# Patient Record
Sex: FEMALE
[De-identification: names, ages, dates, MRNs, and addresses within clinical notes are randomized per-mention and may not be internally consistent; named-entity substitution may affect disease eponyms.]

---

## 2018-03-06 ENCOUNTER — CHARTING TRANS (OUTPATIENT)
Dept: OTHER | Age: 33
End: 2018-03-06

## 2021-08-18 ENCOUNTER — OFFICE VISIT (OUTPATIENT)
Dept: FAMILY MEDICINE CLINIC | Facility: CLINIC | Age: 36
End: 2021-08-18
Payer: COMMERCIAL

## 2021-08-18 VITALS
RESPIRATION RATE: 16 BRPM | HEIGHT: 62 IN | OXYGEN SATURATION: 100 % | WEIGHT: 134 LBS | BODY MASS INDEX: 24.66 KG/M2 | SYSTOLIC BLOOD PRESSURE: 94 MMHG | DIASTOLIC BLOOD PRESSURE: 60 MMHG | HEART RATE: 79 BPM

## 2021-08-18 DIAGNOSIS — Z00.00 PHYSICAL EXAM, ANNUAL: Primary | ICD-10-CM

## 2021-08-18 DIAGNOSIS — R09.81 NASAL CONGESTION: ICD-10-CM

## 2021-08-18 DIAGNOSIS — Z00.00 LABORATORY EXAMINATION ORDERED AS PART OF A ROUTINE GENERAL MEDICAL EXAMINATION: ICD-10-CM

## 2021-08-18 DIAGNOSIS — Z13.89 SCREENING FOR GENITOURINARY CONDITION: ICD-10-CM

## 2021-08-18 DIAGNOSIS — R10.13 EPIGASTRIC ABDOMINAL PAIN: ICD-10-CM

## 2021-08-18 PROCEDURE — 3074F SYST BP LT 130 MM HG: CPT | Performed by: FAMILY MEDICINE

## 2021-08-18 PROCEDURE — 3078F DIAST BP <80 MM HG: CPT | Performed by: FAMILY MEDICINE

## 2021-08-18 PROCEDURE — 3008F BODY MASS INDEX DOCD: CPT | Performed by: FAMILY MEDICINE

## 2021-08-18 PROCEDURE — 99385 PREV VISIT NEW AGE 18-39: CPT | Performed by: FAMILY MEDICINE

## 2021-08-18 NOTE — PROGRESS NOTES
HPI:   Gerard Soria is a 39year old female who presents for a complete physical exam. Symptoms: denies discharge, itching, burning or dysuria. Patient complains of having some pain in epigastric area.   Patient started to drink some teaand got better with Use: Never used    Alcohol use: No    Drug use: No    Occ: . : Yes.  Children: 2       REVIEW OF SYSTEMS:   GENERAL: feels well otherwise  SKIN: denies any unusual skin lesions  EYES:denies blurred vision or double vision  HEENT: denies n general medical examination  Screening for genitourinary condition  Nasal congestion  Epigastric abdominal pain    Orders Placed This Encounter      CBC With Differential With Platelet      Comp Metabolic Panel (14)      Lipid Panel      TSH W Reflex To Fr

## 2021-08-18 NOTE — PATIENT INSTRUCTIONS
Healthy diet. Stay active. Return for fasting blood work. You can walk-in or call [phone] to schedule the test.  See your GYN for your Pap smear and pelvic examination. Consider seeing allergist depending on your symptoms.   Set up an appointment aft

## 2021-08-23 ENCOUNTER — LAB ENCOUNTER (OUTPATIENT)
Dept: LAB | Age: 36
End: 2021-08-23
Attending: FAMILY MEDICINE
Payer: COMMERCIAL

## 2021-08-23 DIAGNOSIS — Z00.00 LABORATORY EXAMINATION ORDERED AS PART OF A ROUTINE GENERAL MEDICAL EXAMINATION: ICD-10-CM

## 2021-08-23 LAB
ALBUMIN SERPL-MCNC: 4 G/DL (ref 3.4–5)
ALBUMIN/GLOB SERPL: 1.2 {RATIO} (ref 1–2)
ALP LIVER SERPL-CCNC: 48 U/L
ALT SERPL-CCNC: 20 U/L
ANION GAP SERPL CALC-SCNC: 1 MMOL/L (ref 0–18)
AST SERPL-CCNC: 15 U/L (ref 15–37)
BASOPHILS # BLD AUTO: 0.03 X10(3) UL (ref 0–0.2)
BASOPHILS NFR BLD AUTO: 0.8 %
BILIRUB SERPL-MCNC: 0.4 MG/DL (ref 0.1–2)
BUN BLD-MCNC: 15 MG/DL (ref 7–18)
CALCIUM BLD-MCNC: 8.8 MG/DL (ref 8.5–10.1)
CHLORIDE SERPL-SCNC: 109 MMOL/L (ref 98–112)
CHOLEST SMN-MCNC: 195 MG/DL (ref ?–200)
CO2 SERPL-SCNC: 29 MMOL/L (ref 21–32)
CREAT BLD-MCNC: 0.68 MG/DL
EOSINOPHIL # BLD AUTO: 0.15 X10(3) UL (ref 0–0.7)
EOSINOPHIL NFR BLD AUTO: 3.9 %
ERYTHROCYTE [DISTWIDTH] IN BLOOD BY AUTOMATED COUNT: 11.7 %
GLOBULIN PLAS-MCNC: 3.3 G/DL (ref 2.8–4.4)
GLUCOSE BLD-MCNC: 84 MG/DL (ref 70–99)
HCT VFR BLD AUTO: 39.6 %
HDLC SERPL-MCNC: 75 MG/DL (ref 40–59)
HGB BLD-MCNC: 12.5 G/DL
IMM GRANULOCYTES # BLD AUTO: 0 X10(3) UL (ref 0–1)
IMM GRANULOCYTES NFR BLD: 0 %
LDLC SERPL CALC-MCNC: 111 MG/DL (ref ?–100)
LIPASE SERPL-CCNC: 113 U/L (ref 73–393)
LYMPHOCYTES # BLD AUTO: 1.79 X10(3) UL (ref 1–4)
LYMPHOCYTES NFR BLD AUTO: 46 %
M PROTEIN MFR SERPL ELPH: 7.3 G/DL (ref 6.4–8.2)
MCH RBC QN AUTO: 28.9 PG (ref 26–34)
MCHC RBC AUTO-ENTMCNC: 31.6 G/DL (ref 31–37)
MCV RBC AUTO: 91.5 FL
MONOCYTES # BLD AUTO: 0.3 X10(3) UL (ref 0.1–1)
MONOCYTES NFR BLD AUTO: 7.7 %
NEUTROPHILS # BLD AUTO: 1.62 X10 (3) UL (ref 1.5–7.7)
NEUTROPHILS # BLD AUTO: 1.62 X10(3) UL (ref 1.5–7.7)
NEUTROPHILS NFR BLD AUTO: 41.6 %
NONHDLC SERPL-MCNC: 120 MG/DL (ref ?–130)
OSMOLALITY SERPL CALC.SUM OF ELEC: 288 MOSM/KG (ref 275–295)
PATIENT FASTING Y/N/NP: YES
PATIENT FASTING Y/N/NP: YES
PLATELET # BLD AUTO: 161 10(3)UL (ref 150–450)
POTASSIUM SERPL-SCNC: 3.9 MMOL/L (ref 3.5–5.1)
RBC # BLD AUTO: 4.33 X10(6)UL
SODIUM SERPL-SCNC: 139 MMOL/L (ref 136–145)
TRIGL SERPL-MCNC: 48 MG/DL (ref 30–149)
TSI SER-ACNC: 1.74 MIU/ML (ref 0.36–3.74)
VLDLC SERPL CALC-MCNC: 8 MG/DL (ref 0–30)
WBC # BLD AUTO: 3.9 X10(3) UL (ref 4–11)

## 2021-08-23 PROCEDURE — 80053 COMPREHEN METABOLIC PANEL: CPT

## 2021-08-23 PROCEDURE — 36415 COLL VENOUS BLD VENIPUNCTURE: CPT

## 2021-08-23 PROCEDURE — 85025 COMPLETE CBC W/AUTO DIFF WBC: CPT

## 2021-08-23 PROCEDURE — 80061 LIPID PANEL: CPT

## 2021-08-23 PROCEDURE — 84443 ASSAY THYROID STIM HORMONE: CPT

## 2022-05-27 ENCOUNTER — TELEPHONE (OUTPATIENT)
Dept: FAMILY MEDICINE CLINIC | Facility: CLINIC | Age: 37
End: 2022-05-27

## 2022-05-27 NOTE — TELEPHONE ENCOUNTER
Pt requesting appointment today for evaluation of bumps on back of neck. Pt states noticed several small bumps on her neck on 5/25, pt unsure if these had been there prior or not. Pt did not indicate any pain. Pt hoping for evaluation of bumps before holiday weekend. Please advise?

## 2022-09-13 ENCOUNTER — TELEPHONE (OUTPATIENT)
Dept: FAMILY MEDICINE CLINIC | Facility: CLINIC | Age: 37
End: 2022-09-13

## 2022-09-13 NOTE — TELEPHONE ENCOUNTER
Pt requesting appointment for examination of possible eczema/ allergic reaction on back of neck? Pt states has had this irritation on back of neck ongoing 1 month, worsening and spreading. Pt states the area is very itchy. Notes she has used many different eczema lotions and has offered no relief. Pt hoping to be seen in office ASAP if possible. Routing to triage to determine if additional information is needed. Routing to Dr S to determine if / when apt can be scheduled.     Please advise

## 2022-09-16 ENCOUNTER — OFFICE VISIT (OUTPATIENT)
Dept: FAMILY MEDICINE CLINIC | Facility: CLINIC | Age: 37
End: 2022-09-16
Payer: COMMERCIAL

## 2022-09-16 VITALS
BODY MASS INDEX: 25.21 KG/M2 | DIASTOLIC BLOOD PRESSURE: 62 MMHG | HEART RATE: 70 BPM | WEIGHT: 137 LBS | RESPIRATION RATE: 16 BRPM | TEMPERATURE: 97 F | SYSTOLIC BLOOD PRESSURE: 94 MMHG | HEIGHT: 62 IN

## 2022-09-16 DIAGNOSIS — R53.83 FATIGUE, UNSPECIFIED TYPE: ICD-10-CM

## 2022-09-16 DIAGNOSIS — L30.9 ECZEMA, UNSPECIFIED TYPE: Primary | ICD-10-CM

## 2022-09-16 DIAGNOSIS — Z00.00 LABORATORY EXAMINATION ORDERED AS PART OF A ROUTINE GENERAL MEDICAL EXAMINATION: ICD-10-CM

## 2022-09-16 DIAGNOSIS — D72.819 LEUKOPENIA, UNSPECIFIED TYPE: ICD-10-CM

## 2022-09-16 PROCEDURE — 99213 OFFICE O/P EST LOW 20 MIN: CPT | Performed by: FAMILY MEDICINE

## 2022-09-16 PROCEDURE — 3074F SYST BP LT 130 MM HG: CPT | Performed by: FAMILY MEDICINE

## 2022-09-16 PROCEDURE — 3008F BODY MASS INDEX DOCD: CPT | Performed by: FAMILY MEDICINE

## 2022-09-16 PROCEDURE — 3078F DIAST BP <80 MM HG: CPT | Performed by: FAMILY MEDICINE

## 2022-09-16 RX ORDER — MOMETASONE FUROATE 1 MG/G
1 CREAM TOPICAL 2 TIMES DAILY PRN
Qty: 30 G | Refills: 0 | Status: SHIPPED | OUTPATIENT
Start: 2022-09-16

## 2022-09-16 RX ORDER — ERGOCALCIFEROL (VITAMIN D2) 1250 MCG
CAPSULE ORAL
COMMUNITY

## 2022-09-16 NOTE — PATIENT INSTRUCTIONS
Try mometasone cream twice a day for 2 weeks to the rash on the posterior neck then if needed use hydrocortisone 1% over-the-counter. Use moisturizers. Do not scratch your skin. Monitor symptoms.   Do blood work before your physical.

## 2022-09-26 ENCOUNTER — LAB ENCOUNTER (OUTPATIENT)
Dept: LAB | Age: 37
End: 2022-09-26
Attending: FAMILY MEDICINE

## 2022-09-26 DIAGNOSIS — Z00.00 LABORATORY EXAMINATION ORDERED AS PART OF A ROUTINE GENERAL MEDICAL EXAMINATION: ICD-10-CM

## 2022-09-26 DIAGNOSIS — R53.83 FATIGUE, UNSPECIFIED TYPE: ICD-10-CM

## 2022-09-26 DIAGNOSIS — D72.819 LEUKOPENIA, UNSPECIFIED TYPE: ICD-10-CM

## 2022-09-26 LAB
ALBUMIN SERPL-MCNC: 4.4 G/DL (ref 3.4–5)
ALBUMIN/GLOB SERPL: 1.5 {RATIO} (ref 1–2)
ALP LIVER SERPL-CCNC: 46 U/L
ALT SERPL-CCNC: 16 U/L
ANION GAP SERPL CALC-SCNC: 5 MMOL/L (ref 0–18)
AST SERPL-CCNC: 13 U/L (ref 15–37)
BASOPHILS # BLD AUTO: 0.02 X10(3) UL (ref 0–0.2)
BASOPHILS NFR BLD AUTO: 0.3 %
BILIRUB SERPL-MCNC: 0.4 MG/DL (ref 0.1–2)
BILIRUB UR QL: NEGATIVE
BUN BLD-MCNC: 20 MG/DL (ref 7–18)
BUN/CREAT SERPL: 29.4 (ref 10–20)
CALCIUM BLD-MCNC: 8.7 MG/DL (ref 8.5–10.1)
CHLORIDE SERPL-SCNC: 107 MMOL/L (ref 98–112)
CHOLEST SERPL-MCNC: 193 MG/DL (ref ?–200)
CO2 SERPL-SCNC: 26 MMOL/L (ref 21–32)
COLOR UR: YELLOW
CREAT BLD-MCNC: 0.68 MG/DL
DEPRECATED HBV CORE AB SER IA-ACNC: 62.8 NG/ML
DEPRECATED RDW RBC AUTO: 38 FL (ref 35.1–46.3)
EOSINOPHIL # BLD AUTO: 0.11 X10(3) UL (ref 0–0.7)
EOSINOPHIL NFR BLD AUTO: 1.4 %
ERYTHROCYTE [DISTWIDTH] IN BLOOD BY AUTOMATED COUNT: 11.6 % (ref 11–15)
FASTING PATIENT LIPID ANSWER: YES
FASTING STATUS PATIENT QL REPORTED: YES
GFR SERPLBLD BASED ON 1.73 SQ M-ARVRAT: 115 ML/MIN/1.73M2 (ref 60–?)
GLOBULIN PLAS-MCNC: 2.9 G/DL (ref 2.8–4.4)
GLUCOSE BLD-MCNC: 76 MG/DL (ref 70–99)
GLUCOSE UR-MCNC: NEGATIVE MG/DL
HCT VFR BLD AUTO: 39.6 %
HDLC SERPL-MCNC: 70 MG/DL (ref 40–59)
HGB BLD-MCNC: 13 G/DL
HGB UR QL STRIP.AUTO: NEGATIVE
IMM GRANULOCYTES # BLD AUTO: 0.02 X10(3) UL (ref 0–1)
IMM GRANULOCYTES NFR BLD: 0.3 %
IRON SATN MFR SERPL: 23 %
IRON SERPL-MCNC: 80 UG/DL
LDLC SERPL CALC-MCNC: 115 MG/DL (ref ?–100)
LEUKOCYTE ESTERASE UR QL STRIP.AUTO: NEGATIVE
LYMPHOCYTES # BLD AUTO: 1.61 X10(3) UL (ref 1–4)
LYMPHOCYTES NFR BLD AUTO: 20.7 %
MCH RBC QN AUTO: 29.3 PG (ref 26–34)
MCHC RBC AUTO-ENTMCNC: 32.8 G/DL (ref 31–37)
MCV RBC AUTO: 89.2 FL
MONOCYTES # BLD AUTO: 0.48 X10(3) UL (ref 0.1–1)
MONOCYTES NFR BLD AUTO: 6.2 %
NEUTROPHILS # BLD AUTO: 5.55 X10 (3) UL (ref 1.5–7.7)
NEUTROPHILS # BLD AUTO: 5.55 X10(3) UL (ref 1.5–7.7)
NEUTROPHILS NFR BLD AUTO: 71.1 %
NITRITE UR QL STRIP.AUTO: NEGATIVE
NONHDLC SERPL-MCNC: 123 MG/DL (ref ?–130)
OSMOLALITY SERPL CALC.SUM OF ELEC: 287 MOSM/KG (ref 275–295)
PH UR: 5.5 [PH] (ref 5–8)
PLATELET # BLD AUTO: 159 10(3)UL (ref 150–450)
POTASSIUM SERPL-SCNC: 4 MMOL/L (ref 3.5–5.1)
PROT SERPL-MCNC: 7.3 G/DL (ref 6.4–8.2)
RBC # BLD AUTO: 4.44 X10(6)UL
SODIUM SERPL-SCNC: 138 MMOL/L (ref 136–145)
SP GR UR STRIP: >=1.03 (ref 1–1.03)
TIBC SERPL-MCNC: 344 UG/DL (ref 240–450)
TRANSFERRIN SERPL-MCNC: 231 MG/DL (ref 200–360)
TRIGL SERPL-MCNC: 43 MG/DL (ref 30–149)
TSI SER-ACNC: 1.16 MIU/ML (ref 0.36–3.74)
UROBILINOGEN UR STRIP-ACNC: 0.2
VIT B12 SERPL-MCNC: 606 PG/ML (ref 193–986)
VIT D+METAB SERPL-MCNC: 10.5 NG/ML (ref 30–100)
VLDLC SERPL CALC-MCNC: 7 MG/DL (ref 0–30)
WBC # BLD AUTO: 7.8 X10(3) UL (ref 4–11)

## 2022-09-26 PROCEDURE — 84466 ASSAY OF TRANSFERRIN: CPT | Performed by: FAMILY MEDICINE

## 2022-09-26 PROCEDURE — 80061 LIPID PANEL: CPT | Performed by: FAMILY MEDICINE

## 2022-09-26 PROCEDURE — 82728 ASSAY OF FERRITIN: CPT | Performed by: FAMILY MEDICINE

## 2022-09-26 PROCEDURE — 82607 VITAMIN B-12: CPT | Performed by: FAMILY MEDICINE

## 2022-09-26 PROCEDURE — 82306 VITAMIN D 25 HYDROXY: CPT | Performed by: FAMILY MEDICINE

## 2022-09-26 PROCEDURE — 83540 ASSAY OF IRON: CPT | Performed by: FAMILY MEDICINE

## 2022-09-26 PROCEDURE — 81001 URINALYSIS AUTO W/SCOPE: CPT | Performed by: FAMILY MEDICINE

## 2022-09-26 PROCEDURE — 80050 GENERAL HEALTH PANEL: CPT | Performed by: FAMILY MEDICINE

## 2022-11-04 ENCOUNTER — OFFICE VISIT (OUTPATIENT)
Dept: FAMILY MEDICINE CLINIC | Facility: CLINIC | Age: 37
End: 2022-11-04
Payer: COMMERCIAL

## 2022-11-04 VITALS
SYSTOLIC BLOOD PRESSURE: 102 MMHG | BODY MASS INDEX: 25.4 KG/M2 | WEIGHT: 138 LBS | HEIGHT: 62 IN | HEART RATE: 66 BPM | DIASTOLIC BLOOD PRESSURE: 64 MMHG | TEMPERATURE: 98 F | RESPIRATION RATE: 16 BRPM

## 2022-11-04 DIAGNOSIS — E55.9 VITAMIN D DEFICIENCY: ICD-10-CM

## 2022-11-04 DIAGNOSIS — Z12.4 SCREENING FOR CERVICAL CANCER: ICD-10-CM

## 2022-11-04 DIAGNOSIS — R94.6 ABNORMAL FINDING ON EXAMINATION OF THYROID GLAND: ICD-10-CM

## 2022-11-04 DIAGNOSIS — E78.00 HYPERCHOLESTEREMIA: ICD-10-CM

## 2022-11-04 DIAGNOSIS — Z00.00 PHYSICAL EXAM, ANNUAL: Primary | ICD-10-CM

## 2022-11-04 PROCEDURE — 3074F SYST BP LT 130 MM HG: CPT | Performed by: FAMILY MEDICINE

## 2022-11-04 PROCEDURE — 3078F DIAST BP <80 MM HG: CPT | Performed by: FAMILY MEDICINE

## 2022-11-04 PROCEDURE — 3008F BODY MASS INDEX DOCD: CPT | Performed by: FAMILY MEDICINE

## 2022-11-04 PROCEDURE — 99395 PREV VISIT EST AGE 18-39: CPT | Performed by: FAMILY MEDICINE

## 2022-11-04 NOTE — PATIENT INSTRUCTIONS
Call [phone] to schedule ultrasound of the thyroid. Healthy diet. Stay active. Try Rossana tea,Chamomille  tea or peppermint tea for relaxation.

## 2022-12-27 ENCOUNTER — MOBILE ENCOUNTER (OUTPATIENT)
Dept: FAMILY MEDICINE CLINIC | Facility: CLINIC | Age: 37
End: 2022-12-27

## 2022-12-28 NOTE — PROGRESS NOTES
Patient is a 15-year-old female who called this evening with a chief complaint of recurrent vomiting since this morning. She states that she has had a headache all day. She is getting lightheaded when she stands up. The patient requests Zofran. I instructed the patient that she really needs to receive some fluids because she appears to be significantly dehydrated. I instructed her to go to the emergency department at THE [facility] for IV  rehydration and possibly for a prescription for Zofran. Patient is reluctant but does agree to go.

## 2023-02-21 ENCOUNTER — OFFICE VISIT (OUTPATIENT)
Dept: FAMILY MEDICINE CLINIC | Facility: CLINIC | Age: 38
End: 2023-02-21
Payer: COMMERCIAL

## 2023-02-21 VITALS
TEMPERATURE: 98 F | DIASTOLIC BLOOD PRESSURE: 60 MMHG | HEIGHT: 63 IN | OXYGEN SATURATION: 98 % | HEART RATE: 70 BPM | WEIGHT: 136 LBS | SYSTOLIC BLOOD PRESSURE: 98 MMHG | RESPIRATION RATE: 16 BRPM | BODY MASS INDEX: 24.1 KG/M2

## 2023-02-21 DIAGNOSIS — L03.012 PARONYCHIA OF LEFT THUMB: Primary | ICD-10-CM

## 2023-02-21 PROCEDURE — 3008F BODY MASS INDEX DOCD: CPT | Performed by: NURSE PRACTITIONER

## 2023-02-21 PROCEDURE — 99213 OFFICE O/P EST LOW 20 MIN: CPT | Performed by: NURSE PRACTITIONER

## 2023-02-21 PROCEDURE — 3074F SYST BP LT 130 MM HG: CPT | Performed by: NURSE PRACTITIONER

## 2023-02-21 PROCEDURE — 3078F DIAST BP <80 MM HG: CPT | Performed by: NURSE PRACTITIONER

## 2023-02-21 RX ORDER — CEPHALEXIN 500 MG/1
500 TABLET ORAL 3 TIMES DAILY
Qty: 21 TABLET | Refills: 0 | Status: SHIPPED | OUTPATIENT
Start: 2023-02-21 | End: 2023-02-28

## 2023-02-22 NOTE — PATIENT INSTRUCTIONS
Soak your nail in warm water for 20 minutes 3 times a day. Apply antibiotic ointment after each warm soak. Complete all of the antibiotic as prescribed. Take with food  Eat yogurt or take a probiotic daily for 2 weeks to help prevent antibiotic associated diarrhea  Call your health care provider if:  Symptoms continue despite treatment  Symptoms worsen or new symptoms develop, such as chills, red streaks along the skin, fever, general ill feeling, joint pain, local spread of symptoms, or muscle pain.

## 2023-05-25 ENCOUNTER — TELEPHONE (OUTPATIENT)
Dept: FAMILY MEDICINE CLINIC | Facility: CLINIC | Age: 38
End: 2023-05-25

## 2023-05-25 NOTE — TELEPHONE ENCOUNTER
PT given birth control for cramps and has several questions regarding same.  GYNE gave to her not in medication list.  Pls call

## 2023-05-25 NOTE — TELEPHONE ENCOUNTER
Pt called and wanted to discuss below. Informed pt since her GYNE is starting her on  Medications, I advised her to call her GYNE for further recommendations and to better answer her questions. Pt voiced understanding and agreed to call her GYNE.

## 2023-06-01 ENCOUNTER — HOSPITAL ENCOUNTER (OUTPATIENT)
Dept: ULTRASOUND IMAGING | Facility: HOSPITAL | Age: 38
Discharge: HOME | End: 2023-06-01
Attending: FAMILY MEDICINE
Payer: COMMERCIAL

## 2023-06-01 DIAGNOSIS — R94.6 ABNORMAL FINDING ON EXAMINATION OF THYROID GLAND: ICD-10-CM

## 2023-06-01 PROCEDURE — 76536 US EXAM OF HEAD AND NECK: CPT | Performed by: FAMILY MEDICINE

## 2023-06-02 ENCOUNTER — TELEPHONE (OUTPATIENT)
Dept: FAMILY MEDICINE CLINIC | Facility: CLINIC | Age: 38
End: 2023-06-02

## 2023-06-02 DIAGNOSIS — E04.2 MULTIPLE THYROID NODULES: Primary | ICD-10-CM

## 2023-06-02 NOTE — TELEPHONE ENCOUNTER
Pt is requesting an apt before 6/9/23 as she is going out of the country. Pt states she needs some vaccinations as well as a pap and discuss thyroid test results. No available apt's in this time frame. Please advise.

## 2023-06-02 NOTE — TELEPHONE ENCOUNTER
Call to patient reaches VM. I did not leave a VM per patients HIPAA consent.      [facility] sent requesting call back

## 2023-06-02 NOTE — TELEPHONE ENCOUNTER
Please give patient a call. Last physical was November 2022. Patient is not due for physical until November of this year so insurance will not pay for that visit sooner. We can see her November 2023 and get her Pap smear done at that time after she is coming back from her trip. Please ask what vaccination she is referring to? As of now she is okay with all the shots. She could get bivalent COVID booster shot in the pharmacy. Please refer to result note regarding her thyroid ultrasound. Thanks.

## 2023-06-05 NOTE — TELEPHONE ENCOUNTER
Received call back from [facility]. She sts she does not need pap or CPX at this time. Will be going out of the country and needs meningitis vaccine. She will check with travel requirements to determine if she needs MenACWY or MenB vaccine.   Will call back with info

## 2023-07-19 ENCOUNTER — OFFICE VISIT (OUTPATIENT)
Dept: OTOLARYNGOLOGY | Facility: CLINIC | Age: 38
End: 2023-07-19
Payer: COMMERCIAL

## 2023-07-19 DIAGNOSIS — E04.1 THYROID NODULE: Primary | ICD-10-CM

## 2023-07-19 PROCEDURE — 99203 OFFICE O/P NEW LOW 30 MIN: CPT | Performed by: SPECIALIST

## 2023-07-19 NOTE — PROGRESS NOTES
Jermain Mtz is a 45year old female. Patient presents with:  Thyroid Nodule: Pt here to review abnormal thyroid nodule ultrasound. HPI:   Patient here had an ultrasound done on 6/2/2023 which had a highly suspicious nodule on the left. Patient with no other symptoms. Current Outpatient Medications   Medication Sig Dispense Refill    mupirocin 2 % External Ointment Apply 1 Application. topically 3 (three) times daily. Apply 3 times daily for 10 days. (Patient not taking: Reported on 7/19/2023) 15 g 0      History reviewed. No pertinent past medical history.    Social History:  Social History     Socioeconomic History    Marital status:    Tobacco Use    Smoking status: Never    Smokeless tobacco: Never   Vaping Use    Vaping Use: Never used   Substance and Sexual Activity    Alcohol use: No    Drug use: No    Sexual activity: Yes     Partners: Male   Other Topics Concern    Caffeine Concern No    Exercise Yes    Seat Belt Yes    Self-Exams Yes        REVIEW OF SYSTEMS:   GENERAL HEALTH: feels well otherwise  GENERAL : denies fever, chills, sweats, weight loss, weight gain  SKIN: denies any unusual skin lesions or rashes  RESPIRATORY: denies shortness of breath with exertion  NEURO: denies headaches    EXAM:   LMP 02/19/2023   System Details   Skin Inspection - Normal.   Constitutional Overall appearance - Normal.   Head/Face Facial features - Normal. Eyebrows - Normal. Skull - Normal.   Eyes Conjunctiva - Right: Normal, Left: Normal. Pupil - Right: Normal, Left: Normal.    Ears Inspection - Right: Normal, Left: Normal.   Canal - Right: Normal, Left: Normal.   TM - Right: Normal, Left: Normal.   Nasal External nose - Normal.   Nasal septum - Normal.  Turbinates - Normal.   Oral/Oropharynx Lips - Normal, Tonsils - Normal, Tongue - Normal    Neck Exam Inspection - Normal. Palpation - Normal. Parotid gland - Normal. Thyroid gland -no palpable thyroid mass   Larynx Ear examination can only see posterior larynx but normal vocal cord mobility   Lymph Detail Submental. Submandibular. Anterior cervical. Posterior cervical. Supraclavicular all without enlargement   Psychiatric Orientation - Oriented to time, place, person & situation. Appropriate mood and affect. Neurological Memory - Normal. Cranial nerves - Cranial nerves II through XII grossly intact. ASSESSMENT AND PLAN:   1. Thyroid nodule  TI RADS 5 left thyroid nodule 1.1 cm. Difficult to palpate on physical examination. Sent for ultrasound-guided fine-needle aspirate. We discussed that if this was malignant a total thyroidectomy would be recommended. No palpable neck adenopathy or laryngeal dysfunction noted on visit. - US FNA THYROID, GUIDE INCLD, FIRST LESION (CPT=10005); Future      The patient indicates understanding of these issues and agrees to the plan.       Makenna Norman MD  7/19/2023  5:36 PM

## 2023-07-19 NOTE — PATIENT INSTRUCTIONS
An ultrasound-guided fine-needle aspirate was ordered for your left thyroid nodule. No palpable adenopathy or vocal cord dysfunction noted on the day to your visit. I will of course call you with the results. If malignant, a total thyroidectomy would be recommended.

## 2023-08-01 ENCOUNTER — PATIENT MESSAGE (OUTPATIENT)
Dept: OTOLARYNGOLOGY | Facility: CLINIC | Age: 38
End: 2023-08-01

## 2023-08-02 ENCOUNTER — TELEPHONE (OUTPATIENT)
Dept: OTOLARYNGOLOGY | Facility: CLINIC | Age: 38
End: 2023-08-02

## 2023-08-02 NOTE — TELEPHONE ENCOUNTER
Patient is calling to follow up on message below that she had sent thru [employer]. Please advise     Good Morning,  I am little confuse for the biopsy ultrasound. It says: If no Thyroid Ultrasound within the past 6 months, order a Thyroid Ultrasound to precede the biopsy. I got regular thyroid ultrasound done couple of months back ago. Do I have to wait for 6 months, because of previous ultrasound? Or I should get it done right away? Please confirm.      Thank you  Luz Decker

## 2023-08-04 NOTE — TELEPHONE ENCOUNTER
Got into contact with patient. Patient shared concerns of being confused regarding the ultrasound. She also stated she was unsure why she was advised to scheduled for a US FNA thyroid. Would like to speak with Dr. Dashawn Anthony. Dr. Dashawn Anthony please advise.

## 2023-08-04 NOTE — TELEPHONE ENCOUNTER
From: Zoila No  To: Olesya Ji MD  Sent: 8/1/2023 9:21 AM CDT  Subject: Biopsy    Good Morning,  I am little confuse for the biopsy ultrasound. It says: If no Thyroid Ultrasound within the past 6 months, order a Thyroid Ultrasound to precede the biopsy. I got regular thyroid ultrasound done couple of months back ago. Do I have to wait for 6 months, because of previous ultrasound? Or I should get it done right away? Please confirm.     Thank you  Fer King

## 2023-08-10 NOTE — TELEPHONE ENCOUNTER
Spoke to patient, she has several concerns regarding her thyroid and why the nodules could not be felt. Answered patient's questions. She is scheduled for the biopsy and assured her the radiologist will explain the procedure to you before it begins. Patient understanding.

## 2023-08-18 ENCOUNTER — HOSPITAL ENCOUNTER (OUTPATIENT)
Dept: ULTRASOUND IMAGING | Facility: HOSPITAL | Age: 38
Discharge: HOME | End: 2023-08-18
Attending: SPECIALIST
Payer: COMMERCIAL

## 2023-08-18 VITALS — OXYGEN SATURATION: 100 % | SYSTOLIC BLOOD PRESSURE: 105 MMHG | DIASTOLIC BLOOD PRESSURE: 67 MMHG | HEART RATE: 67 BPM

## 2023-08-18 DIAGNOSIS — E04.1 THYROID NODULE: ICD-10-CM

## 2023-08-18 PROCEDURE — 88173 CYTOPATH EVAL FNA REPORT: CPT | Performed by: SPECIALIST

## 2023-08-18 PROCEDURE — 10005 FNA BX W/US GDN 1ST LES: CPT | Performed by: SPECIALIST

## 2023-08-18 NOTE — IMAGING NOTE
Arrived at 11:35 in room 6 in ultrasound, Pt complained of nausea and dizziness, pt placed flat in bed, Ice packs to front of chest and one behind her neck, 1140 pt stated, \"I'm feeling better now. \" Dr. Judith Prader called to room, \"When patient is feeling better she may go home. \" 1150 last vitals done, see flowsheet, juice given to patient. Pt stood up, denies dizziness or nausea, pt ambulated out to [employer] by Halie Fletcher.

## 2023-08-22 ENCOUNTER — TELEPHONE (OUTPATIENT)
Dept: OTOLARYNGOLOGY | Facility: CLINIC | Age: 38
End: 2023-08-22

## 2023-08-23 NOTE — TELEPHONE ENCOUNTER
Dr. Ivan Mcarthur,     Please advise. Patient would like to get in contact with you regarding results on US FNA thyroid. Patient is worried regarding results seen on mychart. Thank you.

## 2023-08-23 NOTE — TELEPHONE ENCOUNTER
Patient upset she has not received call back. States she is worried about the malignant results.  Please advise

## 2023-08-24 NOTE — TELEPHONE ENCOUNTER
Yes if she has decided to proceed with surgery. We do the procedure together and he would need to see her.   I don't think she is at that point yet as she is trying to get a second opinion on the pathology

## 2023-08-24 NOTE — TELEPHONE ENCOUNTER
Juan Mcarthur,     Is the patient to see Dr. Jami Schwartz per the result notes on the cystology fluids 8/18/213? Please advise, thank you.

## 2023-08-24 NOTE — TELEPHONE ENCOUNTER
Was paged by patient last night asking about biopsy results. She had already seen results on MyChart that indicated suspicion for malignancy within the thyroid. I recommended that she call Dr. Katina Culver this morning for follow-up appointment as this was indicated in the chart to discuss possible left thyroidectomy. She understood and agreed with calling ENT in the morning to discuss.

## 2023-08-24 NOTE — TELEPHONE ENCOUNTER
I spoke to her last night. She is considering having the slides sent out to see if a more definitive diagnosis can be made.

## 2023-08-24 NOTE — TELEPHONE ENCOUNTER
Called patient to confirm that results notes have been received to patient. Per Dr. Pretty Hdz note on 8/24/23 she has spoked with the patient and have been in contact. Would like to clarify if patient is still to see Dr. Rodolfo Montalvo per his result notes on patient's cystology with fluids.

## 2023-08-25 NOTE — TELEPHONE ENCOUNTER
Patient called to schedule appt regarding mychart message that was sent.      Future Appointments   Date Time Provider Julio Delatorre   8/31/2023 11:10 AM Tahira Schneider MD [address] [facility]

## 2023-08-28 ENCOUNTER — TELEPHONE (OUTPATIENT)
Dept: FAMILY MEDICINE CLINIC | Facility: CLINIC | Age: 38
End: 2023-08-28

## 2023-08-31 ENCOUNTER — OFFICE VISIT (OUTPATIENT)
Dept: OTOLARYNGOLOGY | Facility: CLINIC | Age: 38
End: 2023-08-31

## 2023-08-31 DIAGNOSIS — E04.1 THYROID NODULE: Primary | ICD-10-CM

## 2023-08-31 PROCEDURE — 99215 OFFICE O/P EST HI 40 MIN: CPT | Performed by: OTOLARYNGOLOGY

## 2023-08-31 RX ORDER — MEDROXYPROGESTERONE ACETATE 10 MG/1
TABLET ORAL
COMMUNITY
Start: 2023-05-23

## 2023-08-31 RX ORDER — NORGESTIMATE AND ETHINYL ESTRADIOL 0.25-0.035
KIT ORAL
COMMUNITY
Start: 2023-05-23

## 2023-09-25 ENCOUNTER — TELEPHONE (OUTPATIENT)
Dept: OTOLARYNGOLOGY | Facility: CLINIC | Age: 38
End: 2023-09-25

## 2023-09-25 NOTE — TELEPHONE ENCOUNTER
Please have patient RTC to go over the results and to discuss management options. Study not absolute in confirming or disputing malignancy.

## 2023-09-25 NOTE — TELEPHONE ENCOUNTER
Juan Gonzáles and spoke with patient regarding your note below. Per patient, she will  a copy of her results and call to schedule when she is available for an appointment.

## 2024-02-19 ENCOUNTER — OFFICE VISIT (OUTPATIENT)
Dept: FAMILY MEDICINE CLINIC | Facility: CLINIC | Age: 39
End: 2024-02-19
Payer: COMMERCIAL

## 2024-02-19 ENCOUNTER — TELEPHONE (OUTPATIENT)
Dept: FAMILY MEDICINE CLINIC | Facility: CLINIC | Age: 39
End: 2024-02-19

## 2024-02-19 VITALS
HEIGHT: 63 IN | BODY MASS INDEX: 26.22 KG/M2 | RESPIRATION RATE: 20 BRPM | WEIGHT: 148 LBS | DIASTOLIC BLOOD PRESSURE: 62 MMHG | HEART RATE: 78 BPM | TEMPERATURE: 97 F | SYSTOLIC BLOOD PRESSURE: 102 MMHG

## 2024-02-19 DIAGNOSIS — E04.2 MULTIPLE THYROID NODULES: ICD-10-CM

## 2024-02-19 DIAGNOSIS — E55.9 VITAMIN D DEFICIENCY: ICD-10-CM

## 2024-02-19 DIAGNOSIS — N91.2 ABSENT MENSES: ICD-10-CM

## 2024-02-19 DIAGNOSIS — R53.83 FATIGUE, UNSPECIFIED TYPE: Primary | ICD-10-CM

## 2024-02-19 DIAGNOSIS — R63.5 WEIGHT GAIN: ICD-10-CM

## 2024-02-19 PROCEDURE — 99214 OFFICE O/P EST MOD 30 MIN: CPT | Performed by: FAMILY MEDICINE

## 2024-02-19 NOTE — TELEPHONE ENCOUNTER
If she can come 5:45 PM I will let her at the end of my day.  Otherwise we can look for SDA at the latter time.  Thank you

## 2024-02-19 NOTE — TELEPHONE ENCOUNTER
Pt asking for appointment today w/ PCP.    Pt would like to f/u on her thyroid as well as discuss concerns w/ menstrual cycle and fatigue.    Pt states is off work today and can come anytime.     Please advise if /when to schedule?

## 2024-02-20 NOTE — PROGRESS NOTES
Yumiko Knight is a 38 year old female.  Tiredness, weight gain, absent menses, thyroid nodules  HPI:   Patient is coming to the office with several problems.  Complains of having tiredness fatigue low energy.  She feels that she is getting sleeping more often obesity now that she gets tired.  It is hard to work.  She goes to bed early but then she is waking up in the morning does not have refreshed sleep.  She had low vitamin D in the past we will check levels.  Does not want to start any medication to help with sleep yet.    Patient complains of having irregular menses.  For the last year she was skipping menses.  She had a menstrual period in July 2023 then she had a menstrual period in December 2023 no.  Since that time.  She says since having her daughter the periods were lasting every 21 days.  She will get a period every month.  This is unusual for her.  Noted that she is gaining weight.  Her diet did not change.  She is working from home right now activity is  decreased because of that.  Her mom had a menopause early.  Bowel movements are normal and urination normal.    Patient has multiple thyroid nodules one of the nodules was biopsied was coming suspicious for cancer.  Patient decided not to have it removed.  Wants to watch it.  She was seeing 2 different ENT Derek Davalos from [place] and also  was seeing  at [facility].  Their recommendation  With the daughter.  Patient will have another testing rechecked this year.    Patient gained 11 pounds since September 2022.    No current outpatient medications on file.      History reviewed. No pertinent past medical history.   Social History:  Social History     Socioeconomic History   • Marital status:    Tobacco Use   • Smoking status: Never   • Smokeless tobacco: Never   Vaping Use   • Vaping Use: Never used   Substance and Sexual Activity   • Alcohol use: No   • Drug use: No   • Sexual activity: Yes     Partners: Male   Other Topics Concern   •  Caffeine Concern No   • Exercise Yes   • Seat Belt Yes   • Self-Exams Yes        REVIEW OF SYSTEMS:   GENERAL HEALTH: feels well otherwise tiredness fatigue low energy  SKIN: denies any unusual skin lesions or rashes  HEENT no congestion no runny nose no sore throat  Neck no neck pain  RESPIRATORY: denies shortness of breath with exertion  CARDIOVASCULAR: denies chest pain on exertion  GI: denies abdominal pain and denies heartburn   not having menses since December skipping menses last year  NEURO: denies headaches  Psych normal mood    EXAM:   /62 (BP Location: Right arm, Patient Position: Sitting, Cuff Size: adult)   Pulse 78   Temp 97 °F (36.1 °C) (Temporal)   Resp 20   Ht 5' 3\" (1.6 m)   Wt 148 lb (67.1 kg)   LMP 02/19/2023   BMI 26.22 kg/m²   GENERAL: well developed, well nourished,in no apparent distress  SKIN: no rashes,no suspicious lesions  HEENT: atraumatic, normocephalic,ears and throat are clear  NECK: supple,  palpable irregular thyroid  LUNGS: clear to auscultation  CARDIO: RRR without murmur  GI: good BS's,no masses, HSM or tenderness  EXTREMITIES: no cyanosis, clubbing or edema  Psychiatric - alert  and oriented x3, normal mood     Results for orders placed or performed during the hospital encounter of 08/18/23   Cytology fluids   Result Value Ref Range    Case Report       Medical Cytology Report                           Case: [id number]                                   Authorizing Provider:  Ana Rizzo MD         Collected:           08/18/2023 11:00 AM          Ordering Location:     [facility] Ultrasound Received:            08/18/2023 12:07 PM          Pathologist:           Padmini Kohli MD                                                           Specimen:    Thyroid left, left mid pole Thyroid                                                        Addendum 1       At the request of Dr. Antonio Davalos, cytologic fluid was sent to [facility] ([place], [place]) for  [facility] genomic sequencing  testing.  The report has been scanned and can be accessed using the hyperlink within this case.      Final Diagnosis:       Fine needle aspiration, left midpole thyroid nodule, direct smears and cytospin:   -Adequate for evaluation.   -Suspicious for papillary thyroid carcinoma.  See comment.  -[place] category V.        Embedded Images      Final Diagnosis Comment       The fine needle aspiration from the left midpole thyroid shows scattered groups of atypical follicular cells with papillary-like architecture, nuclear grooves and nuclear overlap.  The cytologic findings are suspicious for papillary thyroid carcinoma.      Case also seen by Dr. Deepthi Adamson, who concurs.      Clinical Information       Thyroid Nodule           Non Gyne Interpretation  Suspicious for Malignancy (A)      Gross Description       Labeled with the patient's name, medical record number, fine needle aspiration, left midpole thyroid, 4 air-dried Diff-Quik slides, 4 alcohol-fixed Pap slide, and CytoLyt rinse for cytospin.  Molecular tube collected.         ASSESSMENT AND PLAN:     Encounter Diagnoses   Name Primary?   • Fatigue, unspecified type Yes   • Absent menses    • Vitamin D deficiency    • Multiple thyroid nodules    • Weight gain        Orders Placed This Encounter   Procedures   • CBC With Differential With Platelet   • Comp Metabolic Panel (14)   • TSH W Reflex To Free T4   • Vitamin D   • Vitamin B12   • FSH [E]   • LH (Luteinizing Hormone) [E]   • Estradiol [E]   • Progesterone [E]   • HCG, Beta Subunit (Quant Pregnancy Test) [E]   • Cortisol [E]       Meds & Refills for this Visit:  Requested Prescriptions      No prescriptions requested or ordered in this encounter     Call [phone] to schedule blood work in the morning around 8 AM.  Try to exercise regularly every day - start with 10 minutes of exercise tomorrow.  Keep good hydration healthy well-balanced diet.  Set up an appointment  with Dr. Davalos for follow-up on your thyroid.  Further recommendations pending test results.  Schedule complete physical for summer of this year.    Imaging & Consults:  None    The patient indicates understanding of these issues and agrees to the plan.  The patient is asked to return in send after test results follow-up in the summer for complete physical.    The note was dictated using speech recognition software.  Accuracy and grammar in transcription may be subject to error.

## 2024-02-20 NOTE — PATIENT INSTRUCTIONS
Call [phone] to schedule blood work in the morning around 8 AM.  Try to exercise regularly every day - start with 10 minutes of exercise tomorrow.  Keep good hydration healthy well-balanced diet.  Set up an appointment with Dr. Davalos for follow-up on your thyroid.  Further recommendations pending test results.  Schedule complete physical for summer of this year.

## 2024-04-23 ENCOUNTER — TELEPHONE (OUTPATIENT)
Dept: FAMILY MEDICINE CLINIC | Facility: CLINIC | Age: 39
End: 2024-04-23

## 2024-04-23 DIAGNOSIS — N91.2 ABSENT MENSES: ICD-10-CM

## 2024-04-23 DIAGNOSIS — R63.5 WEIGHT GAIN: ICD-10-CM

## 2024-04-23 DIAGNOSIS — E04.2 MULTIPLE THYROID NODULES: ICD-10-CM

## 2024-04-23 DIAGNOSIS — E55.9 VITAMIN D DEFICIENCY: ICD-10-CM

## 2024-04-23 DIAGNOSIS — R53.83 FATIGUE, UNSPECIFIED TYPE: ICD-10-CM

## 2024-04-24 LAB
ABSOLUTE BASOPHILS: 32 CELLS/UL (ref 0–200)
ABSOLUTE EOSINOPHILS: 110 CELLS/UL (ref 15–500)
ABSOLUTE LYMPHOCYTES: 1872 CELLS/UL (ref 850–3900)
ABSOLUTE MONOCYTES: 281 CELLS/UL (ref 200–950)
ABSOLUTE NEUTROPHILS: 2305 CELLS/UL (ref 1500–7800)
ALBUMIN/GLOBULIN RATIO: 1.7 (CALC) (ref 1–2.5)
ALBUMIN: 4.5 G/DL (ref 3.6–5.1)
ALKALINE PHOSPHATASE: 49 U/L (ref 31–125)
ALT: 9 U/L (ref 6–29)
AST: 14 U/L (ref 10–30)
BASOPHILS: 0.7 %
BILIRUBIN, TOTAL: 0.4 MG/DL (ref 0.2–1.2)
BUN: 21 MG/DL (ref 7–25)
CALCIUM: 9.5 MG/DL (ref 8.6–10.2)
CARBON DIOXIDE: 27 MMOL/L (ref 20–32)
CHLORIDE: 105 MMOL/L (ref 98–110)
CORTISOL, TOTAL: 13.7 MCG/DL
CREATININE: 0.55 MG/DL (ref 0.5–0.97)
EGFR: 120 ML/MIN/1.73M2
EOSINOPHILS: 2.4 %
ESTRADIOL: <15 PG/ML
FSH: 145.3 MIU/ML
GLOBULIN: 2.6 G/DL (CALC) (ref 1.9–3.7)
GLUCOSE: 86 MG/DL (ref 65–99)
HCG, TOTAL, QN: <5 MIU/ML
HEMATOCRIT: 42.1 % (ref 35–45)
HEMOGLOBIN: 13.6 G/DL (ref 11.7–15.5)
LH: 44.5 MIU/ML
LYMPHOCYTES: 40.7 %
MCH: 28.6 PG (ref 27–33)
MCHC: 32.3 G/DL (ref 32–36)
MCV: 88.6 FL (ref 80–100)
MONOCYTES: 6.1 %
MPV: 11.6 FL (ref 7.5–12.5)
NEUTROPHILS: 50.1 %
PLATELET COUNT: 174 THOUSAND/UL (ref 140–400)
POTASSIUM: 4.3 MMOL/L (ref 3.5–5.3)
PROGESTERONE: <0.5 NG/ML
PROTEIN, TOTAL: 7.1 G/DL (ref 6.1–8.1)
RDW: 11.8 % (ref 11–15)
RED BLOOD CELL COUNT: 4.75 MILLION/UL (ref 3.8–5.1)
SODIUM: 140 MMOL/L (ref 135–146)
TSH W/REFLEX TO FT4: 1.05 MIU/L
VITAMIN B12: 412 PG/ML (ref 200–1100)
VITAMIN D, 25-OH, TOTAL: 19 NG/ML (ref 30–100)
WHITE BLOOD CELL COUNT: 4.6 THOUSAND/UL (ref 3.8–10.8)

## 2024-04-25 DIAGNOSIS — E55.9 VITAMIN D DEFICIENCY: Primary | ICD-10-CM

## 2024-04-25 RX ORDER — ERGOCALCIFEROL 1.25 MG/1
50000 CAPSULE ORAL WEEKLY
Qty: 12 CAPSULE | Refills: 0 | Status: SHIPPED | OUTPATIENT
Start: 2024-04-25 | End: 2024-07-24

## 2024-06-25 ENCOUNTER — OFFICE VISIT (OUTPATIENT)
Dept: FAMILY MEDICINE CLINIC | Facility: CLINIC | Age: 39
End: 2024-06-25

## 2024-06-25 VITALS
RESPIRATION RATE: 12 BRPM | HEIGHT: 63 IN | TEMPERATURE: 97 F | WEIGHT: 150 LBS | HEART RATE: 80 BPM | BODY MASS INDEX: 26.58 KG/M2 | DIASTOLIC BLOOD PRESSURE: 69 MMHG | SYSTOLIC BLOOD PRESSURE: 100 MMHG

## 2024-06-25 DIAGNOSIS — Z00.00 PHYSICAL EXAM, ANNUAL: Primary | ICD-10-CM

## 2024-06-25 DIAGNOSIS — Z78.0 POSTMENOPAUSAL: ICD-10-CM

## 2024-06-25 DIAGNOSIS — R21 RASH AND NONSPECIFIC SKIN ERUPTION: ICD-10-CM

## 2024-06-25 DIAGNOSIS — Z00.00 LABORATORY EXAMINATION ORDERED AS PART OF A ROUTINE GENERAL MEDICAL EXAMINATION: ICD-10-CM

## 2024-06-25 DIAGNOSIS — Z13.89 SCREENING FOR GENITOURINARY CONDITION: ICD-10-CM

## 2024-06-25 DIAGNOSIS — E55.9 VITAMIN D DEFICIENCY: ICD-10-CM

## 2024-06-25 PROCEDURE — 3078F DIAST BP <80 MM HG: CPT | Performed by: FAMILY MEDICINE

## 2024-06-25 PROCEDURE — 3074F SYST BP LT 130 MM HG: CPT | Performed by: FAMILY MEDICINE

## 2024-06-25 PROCEDURE — 99395 PREV VISIT EST AGE 18-39: CPT | Performed by: FAMILY MEDICINE

## 2024-06-25 PROCEDURE — 3008F BODY MASS INDEX DOCD: CPT | Performed by: FAMILY MEDICINE

## 2024-06-25 NOTE — PROGRESS NOTES
HPI:   Yumiko Knight is a 39 year old female who presents for a complete physical exam. Symptoms: denies discharge, itching, burning or dysuria. Patient complains of having tiredness fatigue.  Low energy.  She is not sure if she was taking vitamin D supplement regularly she will check that.    Has rash on the back of the scalp.  Refer to dermatologist.  Patient has history of abnormal biopsy of the thyroid needs to follow-up with Dr. Begum.  Postmenopausal had a blood work done recommended to do endocrinology evaluation patient never set up an appointment.  Dr. Hansen information was given again to the patient.    Immunization History   Administered Date(s) Administered    Covid-19 Vaccine Pfizer 30 mcg/0.3 ml 02/28/2021, 03/28/2021, 11/20/2021    DTP 07/22/1985, 09/26/1985, 11/05/1985, 11/11/1986, 01/30/1990    FLUZONE 6 months and older PFS 0.5 ml (46707) 03/06/2018    Fluvirin, 3 Years & >, Im 12/08/2014    HEP B, Ped/Adol 08/03/2001, 10/09/2001, 02/13/2002    Influenza 11/12/2015    MMR 07/05/1986, 10/29/2001    Measles 01/20/1986    Meningococcal-Menveo 10-55 YEARS 06/05/2023    Meningococcal-Menveo 2month-55yr 06/05/2023    OPV 07/22/1985, 09/26/1985, 11/05/1985, 11/11/1986, 01/30/1990    TDAP 11/18/2010, 11/13/2015    Varicella 08/03/2001, 08/01/2018      Wt Readings from Last 6 Encounters:   06/25/24 150 lb (68 kg)   02/19/24 148 lb (67.1 kg)   02/21/23 136 lb (61.7 kg)   11/04/22 138 lb (62.6 kg)   09/16/22 137 lb (62.1 kg)   08/18/21 134 lb (60.8 kg)     Body mass index is 26.57 kg/m².     Cholesterol, Total (mg/dL)   Date Value   09/26/2022 193   08/23/2021 195     HDL Cholesterol (mg/dL)   Date Value   09/26/2022 70 (H)   08/23/2021 75 (H)     LDL Cholesterol (mg/dL)   Date Value   09/26/2022 115 (H)   08/23/2021 111 (H)     AST (U/L)   Date Value   04/23/2024 14   09/26/2022 13 (L)   08/23/2021 15     ALT (U/L)   Date Value   04/23/2024 9   09/26/2022 16   08/23/2021 20      No results found for:  \"GLUCOSE\"     Current Outpatient Medications   Medication Sig Dispense Refill    ergocalciferol 1.25 MG (00260 UT) Oral Cap Take 1 capsule (50,000 Units total) by mouth once a week. 12 capsule 0      History reviewed. No pertinent past medical history.   History reviewed. No pertinent surgical history.   Family History   Problem Relation Age of Onset    Cancer Father         liver ca    Hypertension Father     Cancer Mother         kidney ca    Other (RA) Mother       Social History:   Social History     Socioeconomic History    Marital status:    Tobacco Use    Smoking status: Never    Smokeless tobacco: Never   Vaping Use    Vaping status: Never Used   Substance and Sexual Activity    Alcohol use: No    Drug use: No    Sexual activity: Yes     Partners: Male   Other Topics Concern    Caffeine Concern No    Exercise Yes    Seat Belt Yes    Self-Exams Yes     Social Determinants of Health      Received from [facility], [facility]    Housing Stability     Occ: Office. : yes. Children: yes   Exercise: three times per week.  Diet: watches calories closely     REVIEW OF SYSTEMS:   GENERAL: feels well otherwise  SKIN: denies any unusual skin lesions  EYES:denies blurred vision or double vision  HEENT: denies nasal congestion, sinus pain or ST  LUNGS: denies shortness of breath with exertion  CARDIOVASCULAR: denies chest pain on exertion  GI: denies abdominal pain,denies heartburn  : denies dysuria, vaginal discharge or itching, not having periods for almost a year  MUSCULOSKELETAL: denies back pain ,  NEURO: denies headaches  PSYCHE: denies depression or anxiety  HEMATOLOGIC: denies hx of anemia  ENDOCRINE: denies thyroid history  ALL/ASTHMA: denies hx of allergy or asthma    EXAM:   /69 (BP Location: Left arm, Patient Position: Sitting, Cuff Size: adult)   Pulse 80   Temp 97.2 °F (36.2 °C) (Temporal)   Resp 12   Ht 5' 3\" (1.6 m)   Wt 150 lb (68 kg)    BMI 26.57 kg/m²   Body mass index is 26.57 kg/m².   GENERAL: well developed, well nourished,in no apparent distress  SKIN: no rashes,no suspicious lesions rash on the back of the scalp  HEENT: atraumatic, normocephalic,ears and throat are clear  EYES:PERRLA, EOMI, conjunctiva are clear  NECK: supple,no adenopathy palpable irregular thyroid-recommended follow-up with Dr. Begum  CHEST: no chest tenderness  BREAST: no dominant or suspicious mass  LUNGS: clear to auscultation  CARDIO: RRR without murmur  GI: good BS's,no masses, HSM or tenderness  : Deferred will be done by GYN.  RECTAL: Deferred  MUSCULOSKELETAL: back is not tender,FROM of the back  EXTREMITIES: no cyanosis, clubbing or edema  NEURO: Oriented times three,cranial nerves are intact,motor and sensory are grossly intact    ASSESSMENT AND PLAN:   Yumiko Knight is a 39 year old female who presents for a complete physical exam.   Encounter Diagnoses   Name Primary?    Physical exam, annual Yes    Laboratory examination ordered as part of a routine general medical examination     Screening for genitourinary condition     Vitamin D deficiency     Postmenopausal     Rash and nonspecific skin eruption        Orders Placed This Encounter   Procedures    Lipid Panel    Urinalysis with Culture Reflex       Meds & Refills for this Visit:  Requested Prescriptions      No prescriptions requested or ordered in this encounter   Healthy diet.  Stay active.   Do labs.   See endocrinologist.   See Dr Begum.   Set up an appointment with dermatologist for evaluation of the rash on the back of the scalp.  Start taking vitamin D prescription 50,000 unit once a week for 12 weeks.  Take vitamin D3 1000 units on other days of the week.    Imaging & Consults:  ENDOCRINOLOGY - INTERNAL  DERM - INTERNAL      Pap and pelvic done by gyn.  Self breast exam explained. Health maintenance, will check fasting Lipids, CMP, and CBC. Ptwill be at 45  referred for screening colonoscopy. Pt' s  weight is Body mass index is 26.57 kg/m²., recommended low carb diet and aerobic exercise 30 minutes three times weekly.  The patient indicates understanding of these issues and agrees to the plan.  The patient is asked to return for CPX in 1 year.  Sooner depending on the test results.

## 2024-06-25 NOTE — PATIENT INSTRUCTIONS
Healthy diet.  Stay active.   Do labs.   See endocrinologist.   See Dr Begum.   Set up an appointment with dermatologist for evaluation of the rash on the back of the scalp.  Start taking vitamin D prescription 50,000 unit once a week for 12 weeks.  Take vitamin D3 1000 units on other days of the week.

## 2024-06-26 ENCOUNTER — TELEPHONE (OUTPATIENT)
Dept: FAMILY MEDICINE CLINIC | Facility: CLINIC | Age: 39
End: 2024-06-26

## 2024-06-26 NOTE — TELEPHONE ENCOUNTER
Patient signed medical records request form in office to receive records from Dr Shalini Villarreal. Request form faxed to [phone] on 6/25/2024.    Request form also sent to scanning to be scanned to patient chart.

## 2024-07-02 ENCOUNTER — MED REC SCAN ONLY (OUTPATIENT)
Dept: FAMILY MEDICINE CLINIC | Facility: CLINIC | Age: 39
End: 2024-07-02

## 2024-07-12 LAB
APPEARANCE: CLEAR
BILIRUBIN: NEGATIVE
CHOL/HDLC RATIO: 2.7 (CALC)
CHOLESTEROL, TOTAL: 241 MG/DL
COLOR: YELLOW
GLUCOSE: NEGATIVE
HDL CHOLESTEROL: 88 MG/DL
KETONES: NEGATIVE
LDL-CHOLESTEROL: 138 MG/DL (CALC)
LEUKOCYTE ESTERASE: NEGATIVE
NITRITE: NEGATIVE
NON-HDL CHOLESTEROL: 153 MG/DL (CALC)
OCCULT BLOOD: NEGATIVE
PH: 7.5 (ref 5–8)
PROTEIN: NEGATIVE
SPECIFIC GRAVITY: 1.02 (ref 1–1.03)
TRIGLYCERIDES: 56 MG/DL

## 2024-07-17 ENCOUNTER — TELEPHONE (OUTPATIENT)
Dept: FAMILY MEDICINE CLINIC | Facility: CLINIC | Age: 39
End: 2024-07-17

## 2024-07-17 DIAGNOSIS — E04.2 MULTIPLE THYROID NODULES: Primary | ICD-10-CM

## 2024-07-17 NOTE — TELEPHONE ENCOUNTER
Called patient back and she voiced understanding of the recommendation, but states she will think about, but wanted Dr. Bentley to order for comparison.  Per patient's request to keep the order for now and will decide wether to use Dr. Bentley's order or call her ENT.

## 2024-07-17 NOTE — TELEPHONE ENCOUNTER
Joyce ([facility]) called regarding ergocalciferol 1.25 MG (94312 UT) Oral Cap prescription that was ordered 4/25/2024. Pharmacist states the patient has not taken medication due to patient cannot eat pork and Capsule form has pork in it. They are asking if we can give a verbal order to change Vitamin D order from capsule to tablet form. Discussed with Dr. Bentley and she gave verbal order. Pharmacy was notified and they will fill medication for patient.     Called patient and gave an update. Patient voiced understanding and agreed with plan of care.     Patient is asking if we can place an order for US Thyroid to re-assess her thyroid nodule. Last US Thyroid done 6/1/2023. Please see pended order and sign if appropriate. Thank you.

## 2024-07-17 NOTE — TELEPHONE ENCOUNTER
I signed ultrasound order as requested however patient was seeing ENT doctor and orders should be coming from them because they would need to compare the ultrasound with previous results and make further decisions regarding what would be the next step.   Thank you

## 2024-11-07 ENCOUNTER — HOSPITAL ENCOUNTER (OUTPATIENT)
Dept: ULTRASOUND IMAGING | Age: 39
Discharge: HOME | End: 2024-11-07
Attending: FAMILY MEDICINE
Payer: COMMERCIAL

## 2024-11-07 DIAGNOSIS — E04.2 MULTIPLE THYROID NODULES: ICD-10-CM

## 2024-11-07 PROCEDURE — 76536 US EXAM OF HEAD AND NECK: CPT | Performed by: FAMILY MEDICINE

## 2025-04-23 ENCOUNTER — TELEPHONE (OUTPATIENT)
Dept: FAMILY MEDICINE CLINIC | Facility: CLINIC | Age: 40
End: 2025-04-23

## 2025-04-23 NOTE — TELEPHONE ENCOUNTER
Per patient, for the past 3-4 weeks, she is having mild discomfort when pressing on the left nipple. Not a lot of pain doing this, but noticeable. She thought it would go away, but has not. If not pressing on the left nipple, feels fine, no pain. No pain to the surrounding breast at all. No palpable lumps, no swelling, no visible abnormalities.     Also has very little amount of clear discharge if she squeezes bilateral nipples, unsure if she had this before, has not tried checked previously.    Last known menses was a little over one year ago. She is fully post-menopausal. She seen her gynecologist, Dr Shalini Villarreal for this and had work up done. Never had a mammogram due to her age. Was told the early menoause may have genetically happened. She has not since followed back with gynecologist.  Patient has 2 children, ages 12 and 9 years old. No known family history of breast cancer. However, mother had kidney cancer, father had liver cancer.    Patient will follow up with gynecologist, however wanted to know if Dr. Kelly had any other recommendations.

## 2025-04-23 NOTE — TELEPHONE ENCOUNTER
Patient is having discomfort in left breast around nipple area for the past month. Patient would like to know if she can be seen for this or if order can be placed for mammogram. Please advise.

## 2025-04-23 NOTE — TELEPHONE ENCOUNTER
Informed patient of the recommendations below, call gynecologist today and follow up with them.    Patient agrees to plan and verbalized an understanding.

## 2025-04-23 NOTE — TELEPHONE ENCOUNTER
Will have patient to contact her gynecology office for evaluation especially  that they did some workup and diagnosed patient with early menopause.  They can do also breast examination when they see her.  I would like her to call today to be seen by them.  Thank you